# Patient Record
Sex: FEMALE | Race: WHITE | NOT HISPANIC OR LATINO | Employment: OTHER | URBAN - METROPOLITAN AREA
[De-identification: names, ages, dates, MRNs, and addresses within clinical notes are randomized per-mention and may not be internally consistent; named-entity substitution may affect disease eponyms.]

---

## 2018-03-21 PROBLEM — R07.89 OTHER CHEST PAIN: Status: ACTIVE | Noted: 2018-03-21

## 2018-03-21 NOTE — PROGRESS NOTES
Cardiology Consultation      Hali Garcia  1950  [id number]  [facility] [address]  [facility] [place] [address]    1  Chest pain, unspecified type  Echo stress test w contrast if indicated   2  Shortness of breath     3  Other chest pain          History:     Patient is a 44-year-old female who presents for consultation today regarding chest pain  Approximately 4 years ago she had a spontaneous frontal lobe hemorrhage from what she states was a ruptured cyst   She had neurosurgery and drainage  Since then, she has had a few seizures  Last few months she has become vegetarian  She cooks with her daughter and  she states that when she and her daughter are standing in her kitchen she experiences a tightness in the left side of her chest   She thinks this may be associated with taking her breath away  She walks with a group of friends yesterday and felt fine  She has no exertional symptoms  It seems to only occur when she is standing and cooking with her daughter in the kitchen  This has been ongoing for several weeks  This prompted a urgent center visit and she was ultimately discharged and told to follow-up with cardiologist     She has a history of hyper lipidemia and is on 10 mg of atorvastatin  Her last LDL was 104  She is not known to be hypertensive  Since her neurological event in 2014 things for her have been at times difficult  She has had anxiety, she had a seizure when she was on the elliptical and at times is afraid to exercise  She has had no syncopal episodes  She has a history of hypertension which has been controlled with losartan and amlodipine  No recent accelerated elevation of blood pressure per her recollection  Patient Active Problem List   Diagnosis    Other chest pain    Shortness of breath     No past medical history on file    Social History     Social History    Marital status:      Spouse name: N/A    Number of children: N/A    Years of education: N/A     Occupational History    Not on file  Social History Main Topics    Smoking status: Never Smoker    Smokeless tobacco: Never Used    Alcohol use Not on file    Drug use: Unknown    Sexual activity: Not on file     Other Topics Concern    Not on file     Social History Narrative    No narrative on file      No family history on file  Mother  80, dementia/CVA(); Father  80 laryngeal cancer,  MVA had stage 3 cancer, 1 brother healthy  No past surgical history on file   Brain surgery [facility]    Current Outpatient Prescriptions:     Acetaminophen 500 MG, Take 1 capsule by mouth, Disp: , Rfl:     amLODIPine (NORVASC) 2 5 mg tablet, TK 1 T PO D, Disp: , Rfl: 5    atorvastatin (LIPITOR) 10 mg tablet, TK 1 T PO NIGHTLY, Disp: , Rfl: 5    clonazePAM (KlonoPIN) 0 5 mg tablet, TK 1 T PO Q 12 H, Disp: , Rfl: 4    gabapentin (NEURONTIN) 100 mg capsule, , Disp: , Rfl: 3    lacosamide (VIMPAT) 200 mg tablet, Take 200 mg by mouth, Disp: , Rfl:     loratadine (CLARITIN) 10 mg tablet, Take 10 mg by mouth, Disp: , Rfl:     losartan (COZAAR) 50 mg tablet, TK 1 T PO QD, Disp: , Rfl: 5    Calcium Carb-Cholecalciferol (CALCIUM CARBONATE-VITAMIN D3 PO), Take by mouth, Disp: , Rfl:     clindamycin (CLEOCIN) 150 mg capsule, , Disp: , Rfl: 0  Allergies   Allergen Reactions    Carisoprodol Hives    Other     Penicillins Hives       Labs: No results found for: NA, K, CL, CO2, BUN, CREATININE, LABGLOM, GLUCOSE, CALCIUM    No results found for: WBC, HGB, HCT, PLT    No results found for: CHOL  No results found for: HDL  No results found for: LDLCALC  No results found for: TRIG    No results found for: ALT, AST          No results found for: NTBNP    No results found for: HGBA1C     Direct , TG 89, HDL 62, , creat 0 65, AST 15, ALT 11; TSH 1 16 2018    Imaging: Reviewed in epic      Review of Systems:  14 systems reviewed and negative with exception of the above     Review of Systems    PHYSICAL EXAM:    Physical Exam    Vitals:    03/23/18 1453   BP: 118/74   Pulse: 72     There is no height or weight on file to calculate BMI  Weight (last 2 days)     Date/Time   Weight    03/23/18 1453  66 5 (146 6)              Gen: No acute distress  HEENT: anicteric, mucous membranes moist  Neck: supple, no jugular venous distention, or carotid bruit  Heart: regular, normal s1 and s2, no murmur/rub or gallop  Lungs :clear to auscultation bilaterally, no rales/rhonchi or wheeze  Abdomen: soft nontender, normoactive bowel sounds, no organomegaly  Ext: warm and perfused, normal femoral pulses, no edema, or clubbing  Skin: warm, no rashes  Neuro: AAO x 3, no focal findings  Psychiatric: normal affect  Musculoskeletal: no obvious joint deformities  Discussion/Summary:    1  Chest pain, atypical with associated dyspnea (seems to occur with standing only; not exertional)    2  H/o hemorrhagic CVA/ left frontal hemorrhage, follows [facility]    3  Hyperlipidemia,  on atorva 10mg, no known vascular disease    Plan:  Patient has cardiac risk factors which include hypertension and elevated LDL  Symptoms, or atypical   Seems only getting when she stands in the kitchen cooking  She walked yesterday without difficulty  She states she is agreeable to exercise on the treadmill which she thinks she can do  With her dyspnea, we will plan on doing a stress echocardiogram   She prefers phone follow-up afterwards  I have encouraged her to continue on her current sensible diet and try to attempt daily exercise

## 2018-03-22 RX ORDER — GABAPENTIN 100 MG/1
CAPSULE ORAL
Refills: 3 | COMMUNITY
Start: 2018-02-09

## 2018-03-22 RX ORDER — CLINDAMYCIN HYDROCHLORIDE 150 MG/1
CAPSULE ORAL
Refills: 0 | COMMUNITY
Start: 2017-12-21

## 2018-03-22 RX ORDER — AMLODIPINE BESYLATE 2.5 MG/1
TABLET ORAL
Refills: 5 | COMMUNITY
Start: 2018-02-22

## 2018-03-22 RX ORDER — GABAPENTIN 300 MG/1
CAPSULE ORAL
Refills: 3 | COMMUNITY
Start: 2018-02-01 | End: 2018-03-23 | Stop reason: ALTCHOICE

## 2018-03-22 RX ORDER — LOSARTAN POTASSIUM 50 MG/1
TABLET ORAL
Refills: 5 | COMMUNITY
Start: 2018-02-22

## 2018-03-22 RX ORDER — LACOSAMIDE 200 MG/1
TABLET, FILM COATED ORAL
Refills: 2 | COMMUNITY
Start: 2018-02-22 | End: 2018-03-23 | Stop reason: SDUPTHER

## 2018-03-22 RX ORDER — ATORVASTATIN CALCIUM 10 MG/1
TABLET, FILM COATED ORAL
Refills: 5 | COMMUNITY
Start: 2018-02-22

## 2018-03-22 RX ORDER — CLONAZEPAM 0.5 MG/1
TABLET ORAL
Refills: 4 | COMMUNITY
Start: 2018-03-04

## 2018-03-23 ENCOUNTER — OFFICE VISIT (OUTPATIENT)
Dept: CARDIOLOGY CLINIC | Facility: CLINIC | Age: 68
End: 2018-03-23
Payer: MEDICARE

## 2018-03-23 VITALS — SYSTOLIC BLOOD PRESSURE: 118 MMHG | DIASTOLIC BLOOD PRESSURE: 74 MMHG | HEART RATE: 72 BPM | WEIGHT: 146.6 LBS

## 2018-03-23 DIAGNOSIS — R06.02 SHORTNESS OF BREATH: ICD-10-CM

## 2018-03-23 DIAGNOSIS — R07.9 CHEST PAIN, UNSPECIFIED TYPE: Primary | ICD-10-CM

## 2018-03-23 DIAGNOSIS — R07.89 OTHER CHEST PAIN: ICD-10-CM

## 2018-03-23 PROCEDURE — 99204 OFFICE O/P NEW MOD 45 MIN: CPT | Performed by: INTERNAL MEDICINE

## 2018-03-23 RX ORDER — LACOSAMIDE 200 MG/1
200 TABLET ORAL
COMMUNITY
Start: 2017-12-13

## 2018-03-23 RX ORDER — LORATADINE 10 MG/1
10 TABLET ORAL
COMMUNITY
Start: 2014-08-26

## 2018-04-23 ENCOUNTER — HOSPITAL ENCOUNTER (OUTPATIENT)
Dept: NON INVASIVE DIAGNOSTICS | Facility: CLINIC | Age: 68
Discharge: HOME | End: 2018-04-23
Payer: MEDICARE

## 2018-04-23 DIAGNOSIS — R07.9 CHEST PAIN, UNSPECIFIED TYPE: ICD-10-CM

## 2018-04-23 PROCEDURE — 93350 STRESS TTE ONLY: CPT

## 2018-04-23 PROCEDURE — 93351 STRESS TTE COMPLETE: CPT | Performed by: INTERNAL MEDICINE

## 2018-04-24 LAB
CHEST PAIN STATEMENT: NORMAL
MAX DIASTOLIC BP: 80 MMHG
MAX HEART RATE: 141 BPM
MAX PREDICTED HEART RATE: 155 BPM
MAX. SYSTOLIC BP: 150 MMHG
PROTOCOL NAME: NORMAL
REASON FOR TERMINATION: NORMAL
TARGET HR FORMULA: NORMAL
TEST INDICATION: NORMAL
TIME IN EXERCISE PHASE: NORMAL

## 2018-08-07 ENCOUNTER — EVALUATION (OUTPATIENT)
Dept: PHYSICAL THERAPY | Age: 68
End: 2018-08-07
Payer: MEDICARE

## 2018-08-07 DIAGNOSIS — G89.29 CHRONIC PAIN OF BOTH SHOULDERS: Primary | ICD-10-CM

## 2018-08-07 DIAGNOSIS — M25.512 CHRONIC PAIN OF BOTH SHOULDERS: Primary | ICD-10-CM

## 2018-08-07 DIAGNOSIS — M25.511 CHRONIC PAIN OF BOTH SHOULDERS: Primary | ICD-10-CM

## 2018-08-07 PROCEDURE — G8985 CARRY GOAL STATUS: HCPCS | Performed by: PHYSICAL THERAPIST

## 2018-08-07 PROCEDURE — G8984 CARRY CURRENT STATUS: HCPCS | Performed by: PHYSICAL THERAPIST

## 2018-08-07 PROCEDURE — 97162 PT EVAL MOD COMPLEX 30 MIN: CPT | Performed by: PHYSICAL THERAPIST

## 2018-08-07 NOTE — PROGRESS NOTES
PT Evaluation     Today's date: 2018  Patient name: Dg Kay  : 1950  MRN: [id number]  Referring provider: Belia Cortés MD  Dx:   Encounter Diagnosis     ICD-10-CM    1  Chronic pain of both shoulders M25 511     G89 29     M25 512                   Assessment  Impairments: abnormal or restricted ROM, abnormal movement, activity intolerance, impaired physical strength, pain with function and poor posture   Patient presents with symptom irritability yes  Assessment details: 74year old female patient reports to PT with B chronic shoulder pain  Patient primary impairments seems to be decreased mobility, as patient had both limited and painful AROM and PROM in all directions, with shoulder internal rotation being most affected, which correlates with finding of posterior capsule hypomobility  Patient's posture also affects patient's symptoms as when patient cued to self correct posture, her shoulder ROM increased  Patient will benefit from skilled PT services to address current impairments and functional limitations to help patient return to her PLOF  Understanding of Dx/Px/POC: good   Prognosis: fair  Prognosis details: chronicity    Goals  STG  1  Patient will be independent with completion of HEP throughout therapy  2  Patient will have at worst 3/10 pain so patient can sleep with less discomfort in 3 weeks  LTG  1  Patient will increase B shoulder flexion ROM by at least 75% so patient can complete overhead activities with less difficulty in 6 weeks  2  Patient will increase B shoulder IR ROM by at least 75% so patient can wash her back with less difficulty in 6 weeks       Plan  Patient would benefit from: skilled physical therapy  Planned therapy interventions: joint mobilization, manual therapy, neuromuscular re-education, patient education, strengthening, stretching, therapeutic activities, therapeutic exercise, home exercise program, functional ROM exercises and flexibility  Frequency: 2x week  Duration in weeks: 6  Treatment plan discussed with: patient        Subjective Evaluation    History of Present Illness  Mechanism of injury: Patient reports with B shoulder pain of "many years " Patient denies any numbness/tingling  Patient has trouble elevating B UEs overhead, lifting, washing her back, and sleeping due to her symptoms  Patient has history of 2 brain surgeries, seizures, which are controlled by medications, and a stroke which affects her R side  Pain  Current pain ratin  At best pain ratin  At worst pain ratin  Location: B shoulders  Quality: dull ache and sharp  Aggravating factors: overhead activity and lifting    Treatments  Current treatment: physical therapy  Patient Goals  Patient goals for therapy: decreased pain, increased motion and return to sport/leisure activities          Objective     Static Posture     Head  Forward  Shoulders  Rounded      Postural Observations  Seated posture: fair  Standing posture: fair  Correction of posture: makes symptoms better        Tenderness     Additional Tenderness Details  No TTP noted    Active Range of Motion   Cervical/Thoracic Spine   Normal active range of motion  Left Shoulder   Flexion: 120 degrees with pain  Abduction: 110 degrees with pain    Right Shoulder   Flexion: 135 degrees with pain  Abduction: 120 degrees with pain    Additional Active Range of Motion Details  With correction of posture patient had increase in ROM    Passive Range of Motion   Left Shoulder   Flexion: 150 degrees with pain  Abduction: 110 degrees with pain  External rotation 90°: 45 degrees with pain  Internal rotation 90°: 45 degrees with pain    Right Shoulder   Flexion: 160 degrees with pain  Abduction: 135 degrees with pain  External rotation 90°: 60 degrees with pain  Internal rotation 90°: 30 degrees with pain    Joint Play   Left Shoulder  Hypomobile in the posterior capsule, inferior capsule, thoracic spine and 1st rib  Right Shoulder  Hypomobile in the posterior capsule, inferior capsule, thoracic spine and 1st rib  Strength/Myotome Testing     Left Shoulder     Planes of Motion   Flexion: 4   Abduction: 4   External rotation at 0°: 3+   Internal rotation at 0°: 3+     Right Shoulder     Planes of Motion   Flexion: 4   Abduction: 4   External rotation at 0°: 3+   Internal rotation at 0°: 3+     Tests     Left Shoulder   Positive empty can, Hawkin's, painful arc and passive horizontal adduction  Negative full can  Right Shoulder   Positive empty can, Hawkin's, painful arc and passive horizontal adduction  Negative full can         Flowsheet Rows      Most Recent Value   PT/OT G-Codes   Current Score  58   Projected Score  63   FOTO information reviewed  Yes   Assessment Type  Evaluation   G code set  Carrying, Moving & Handling Objects   Carrying, Moving and Handling Objects Current Status ()  CK   Carrying, Moving and Handling Objects Goal Status ()  CJ          Precautions: history of seizures, 2 brain surgeries, stroke affecting R side     Daily Treatment Diary     Manual              PROM shoulder all motion B             Shoulder stretches all motions B             Posterior shoulder mobs B             Inferior shoulder mobs B             Standing shoulder 90 deg abd mob                 Exercise Diary              Shoulder wall slides flex/abd B             sidelying shoulder ER B             sidelying shoulder IR stretch B                          sidelying shoulder abd             Supine shoulder flex             Prone low trap retraction             Band straight arm pull downs             UBE                                                                                                                                                                Modalities

## 2018-08-07 NOTE — LETTER
2018    MD [address] [place] [address]    Patient: Danya Castro   YOB: 1950   Date of Visit: 2018     Encounter Diagnosis     ICD-10-CM    1  Chronic pain of both shoulders M25 511     G89 29     M25 512        Dear Dr Orquidea Lomax:    Please review the attached Plan of Care from [facility] recent visit  Please verify that you agree therapy should continue by signing the attached document and sending it back to our office  If you have any questions or concerns, please don't hesitate to call  Sincerely,    Yen Valencia, PT      Referring Provider:      I certify that I have read the below Plan of Care and certify the need for these services furnished under this plan of treatment while under my care  MD [facility] [address] [facility] 109: [phone]          PT Evaluation     Today's date: 2018  Patient name: Danya Castro  : 1950  MRN: [id number]  Referring provider: Tim Tripp MD  Dx:   Encounter Diagnosis     ICD-10-CM    1  Chronic pain of both shoulders M25 511     G89 29     M25 512                   Assessment  Impairments: abnormal or restricted ROM, abnormal movement, activity intolerance, impaired physical strength, pain with function and poor posture   Patient presents with symptom irritability yes  Assessment details: 74year old female patient reports to PT with B chronic shoulder pain  Patient primary impairments seems to be decreased mobility, as patient had both limited and painful AROM and PROM in all directions, with shoulder internal rotation being most affected, which correlates with finding of posterior capsule hypomobility  Patient's posture also affects patient's symptoms as when patient cued to self correct posture, her shoulder ROM increased    Patient will benefit from skilled PT services to address current impairments and functional limitations to help patient return to her PLOF  Understanding of Dx/Px/POC: good   Prognosis: fair  Prognosis details: chronicity    Goals  STG  1  Patient will be independent with completion of HEP throughout therapy  2  Patient will have at worst 3/10 pain so patient can sleep with less discomfort in 3 weeks  LTG  1  Patient will increase B shoulder flexion ROM by at least 75% so patient can complete overhead activities with less difficulty in 6 weeks  2  Patient will increase B shoulder IR ROM by at least 75% so patient can wash her back with less difficulty in 6 weeks  Plan  Patient would benefit from: skilled physical therapy  Planned therapy interventions: joint mobilization, manual therapy, neuromuscular re-education, patient education, strengthening, stretching, therapeutic activities, therapeutic exercise, home exercise program, functional ROM exercises and flexibility  Frequency: 2x week  Duration in weeks: 6  Treatment plan discussed with: patient        Subjective Evaluation    History of Present Illness  Mechanism of injury: Patient reports with B shoulder pain of "many years " Patient denies any numbness/tingling  Patient has trouble elevating B UEs overhead, lifting, washing her back, and sleeping due to her symptoms  Patient has history of 2 brain surgeries, seizures, which are controlled by medications, and a stroke which affects her R side  Pain  Current pain ratin  At best pain ratin  At worst pain ratin  Location: B shoulders  Quality: dull ache and sharp  Aggravating factors: overhead activity and lifting    Treatments  Current treatment: physical therapy  Patient Goals  Patient goals for therapy: decreased pain, increased motion and return to sport/leisure activities          Objective     Static Posture     Head  Forward  Shoulders  Rounded      Postural Observations  Seated posture: fair  Standing posture: fair  Correction of posture: makes symptoms better        Tenderness     Additional Tenderness Details  No TTP noted    Active Range of Motion   Cervical/Thoracic Spine   Normal active range of motion  Left Shoulder   Flexion: 120 degrees with pain  Abduction: 110 degrees with pain    Right Shoulder   Flexion: 135 degrees with pain  Abduction: 120 degrees with pain    Additional Active Range of Motion Details  With correction of posture patient had increase in ROM    Passive Range of Motion   Left Shoulder   Flexion: 150 degrees with pain  Abduction: 110 degrees with pain  External rotation 90°:  45 degrees with pain  Internal rotation 90°:  45 degrees with pain    Right Shoulder   Flexion: 160 degrees with pain  Abduction: 135 degrees with pain  External rotation 90°:  60 degrees with pain  Internal rotation 90°:  30 degrees with pain    Joint Play   Left Shoulder  Hypomobile in the posterior capsule, inferior capsule, thoracic spine and 1st rib  Right Shoulder  Hypomobile in the posterior capsule, inferior capsule, thoracic spine and 1st rib  Strength/Myotome Testing     Left Shoulder     Planes of Motion   Flexion: 4   Abduction: 4   External rotation at 0°:  3+   Internal rotation at 0°:  3+     Right Shoulder     Planes of Motion   Flexion: 4   Abduction: 4   External rotation at 0°:  3+   Internal rotation at 0°:  3+     Tests     Left Shoulder   Positive empty can, Hawkin's, painful arc and passive horizontal adduction  Negative full can  Right Shoulder   Positive empty can, Hawkin's, painful arc and passive horizontal adduction  Negative full can         Flowsheet Rows      Most Recent Value   PT/OT G-Codes   Current Score  58   Projected Score  63   FOTO information reviewed  Yes   Assessment Type  Evaluation   G code set  Carrying, Moving & Handling Objects   Carrying, Moving and Handling Objects Current Status ()  CK   Carrying, Moving and Handling Objects Goal Status ()  CJ          Precautions: history of seizures, 2 brain surgeries, stroke affecting R side     Daily Treatment Diary     Manual              PROM shoulder all motion B             Shoulder stretches all motions B             Posterior shoulder mobs B             Inferior shoulder mobs B             Standing shoulder 90 deg abd mob                 Exercise Diary              Shoulder wall slides flex/abd B             sidelying shoulder ER B             sidelying shoulder IR stretch B                          sidelying shoulder abd             Supine shoulder flex             Prone low trap retraction             Band straight arm pull downs             UBE                                                                                                                                                                Modalities

## 2018-08-09 ENCOUNTER — OFFICE VISIT (OUTPATIENT)
Dept: PHYSICAL THERAPY | Age: 68
End: 2018-08-09
Payer: MEDICARE

## 2018-08-09 DIAGNOSIS — G89.29 CHRONIC PAIN OF BOTH SHOULDERS: Primary | ICD-10-CM

## 2018-08-09 DIAGNOSIS — M25.511 CHRONIC PAIN OF BOTH SHOULDERS: Primary | ICD-10-CM

## 2018-08-09 DIAGNOSIS — M25.512 CHRONIC PAIN OF BOTH SHOULDERS: Primary | ICD-10-CM

## 2018-08-09 PROCEDURE — 97140 MANUAL THERAPY 1/> REGIONS: CPT

## 2018-08-09 PROCEDURE — 97110 THERAPEUTIC EXERCISES: CPT

## 2018-08-09 NOTE — PROGRESS NOTES
Daily Note     Today's date: 2018  Patient name: Ellis Watson  : 1950  MRN: [id number]  Referring provider: Bijan Michel MD  Dx:   Encounter Diagnosis     ICD-10-CM    1  Chronic pain of both shoulders M25 511     G89 29     M25 512        Start Time: 953          Subjective: Patient reported 1/10 pain in shoulders L shoulder worse than R        Objective: See treatment diary below      Assessment: Tolerated treatment well  Patient demonstrated fatigue post treatment, exhibited good technique with therapeutic exercises and would benefit from continued PT  Repeated cues required throughout the session on proper technique  HEP reviewed with patient  Pt L shoulder making  Popping noises 3 times but no pain  Plan: Continue per plan of care        Precautions: history of seizures, 2 brain surgeries, stroke affecting R side     Daily Treatment Diary     Manual              PROM shoulder all motion B 5 min            Shoulder stretches all motions B 30sec x3            Posterior shoulder mobs B NT            Inferior shoulder mobs B NT            Standing shoulder 90 deg abd mob NT                Exercise Diary              Shoulder wall slides flex/abd B 20x            sidelying shoulder ER B 2x10            sidelying shoulder IR stretch B 30" x3                         sidelying shoulder abd B 2x10            Supine shoulder flex B 2x10            Prone low trap retraction NT            Band straight arm pull downs NT            UBE 8 min 4/4                                                                                                                                                               Modalities

## 2018-08-14 ENCOUNTER — OFFICE VISIT (OUTPATIENT)
Dept: PHYSICAL THERAPY | Age: 68
End: 2018-08-14
Payer: MEDICARE

## 2018-08-14 DIAGNOSIS — M25.512 CHRONIC PAIN OF BOTH SHOULDERS: Primary | ICD-10-CM

## 2018-08-14 DIAGNOSIS — G89.29 CHRONIC PAIN OF BOTH SHOULDERS: Primary | ICD-10-CM

## 2018-08-14 DIAGNOSIS — M25.511 CHRONIC PAIN OF BOTH SHOULDERS: Primary | ICD-10-CM

## 2018-08-14 PROCEDURE — 97140 MANUAL THERAPY 1/> REGIONS: CPT

## 2018-08-14 PROCEDURE — 97110 THERAPEUTIC EXERCISES: CPT

## 2018-08-14 NOTE — PROGRESS NOTES
Daily Note     Today's date: 2018  Patient name: Chema Harper  : 1950  MRN: [id number]  Referring provider: Zeyad Hills MD  Dx:   Encounter Diagnosis     ICD-10-CM    1  Chronic pain of both shoulders M25 511     G89 29     M25 512                   Subjective: Pt feels more limited in the L UE due to pain medial aspect of the shoulder  Limited with exercises and ROM  Wall slide difficult and caused pain  Objective: See treatment diary below      Assessment: Tolerated treatment well  Patient demonstrated fatigue post treatment, exhibited good technique with therapeutic exercises and would benefit from continued PT  Decrease ER on L UE noted increase ROM noted post manuals  Pt would benefit to continue stretching LUE IR along with ABD due to limited ROM  Plan: Continue per plan of care        Precautions: history of seizures, 2 brain surgeries, stroke affecting R side     Daily Treatment Diary     Manual             PROM shoulder all motion B 5 min 20x BUE           Shoulder stretches all motions B 30sec x3 30x3           Posterior shoulder mobs B NT NT           Inferior shoulder mobs B NT NT           Standing shoulder 90 deg abd mob NT NT               Exercise Diary             Shoulder wall slides flex/abd B 20x HEP           sidelying shoulder ER B 2x10 2x10           sidelying shoulder IR stretch B 30" x3 30" x3                        sidelying shoulder abd B 2x10 2x10           Supine shoulder flex B 2x10 2x10           Prone low trap retraction NT NT           Band straight arm pull downs NT Red 2x10           UBE 8 min 4/4 5 min                                                                                                                                                              Modalities

## 2018-08-17 ENCOUNTER — APPOINTMENT (OUTPATIENT)
Dept: PHYSICAL THERAPY | Age: 68
End: 2018-08-17
Payer: MEDICARE

## 2018-08-21 ENCOUNTER — OFFICE VISIT (OUTPATIENT)
Dept: PHYSICAL THERAPY | Age: 68
End: 2018-08-21
Payer: MEDICARE

## 2018-08-21 DIAGNOSIS — M25.511 CHRONIC PAIN OF BOTH SHOULDERS: Primary | ICD-10-CM

## 2018-08-21 DIAGNOSIS — M25.512 CHRONIC PAIN OF BOTH SHOULDERS: Primary | ICD-10-CM

## 2018-08-21 DIAGNOSIS — G89.29 CHRONIC PAIN OF BOTH SHOULDERS: Primary | ICD-10-CM

## 2018-08-21 PROCEDURE — 97110 THERAPEUTIC EXERCISES: CPT

## 2018-08-21 PROCEDURE — 97140 MANUAL THERAPY 1/> REGIONS: CPT

## 2018-08-21 NOTE — PROGRESS NOTES
Daily Note     Today's date: 2018  Patient name: Mikayla Perkins  : 1950  MRN: [id number]  Referring provider: Tasia Fountain MD  Dx:   Encounter Diagnosis     ICD-10-CM    1  Chronic pain of both shoulders M25 511     G89 29     M25 512                   Subjective: Pt denies pain but mentioned that today       Objective: See treatment diary below      Assessment: Tolerated treatment well  Patient demonstrated fatigue post treatment, exhibited good technique with therapeutic exercises and would benefit from continued PT  Patient upset during the treatment due to clinic song choice  [place] was changed and patient continued with therapy session  Increase difficulty with side lying exercises by adding wt next visit  Plan: Continue per plan of care        Precautions: history of seizures, 2 brain surgeries, stroke affecting R side     Daily Treatment Diary     Manual            PROM shoulder all motion B 5 min 20x BUE 20x BUE          Shoulder stretches all motions B 30sec x3 30x3           Posterior shoulder mobs B NT NT           Inferior shoulder mobs B NT NT           Standing shoulder 90 deg abd mob NT NT               Exercise Diary            Shoulder wall slides flex/abd B 20x HEP           sidelying shoulder ER B 2x10 2x10 2x10          sidelying shoulder IR stretch B 30" x3 30" x3 30"x3                       sidelying shoulder abd B 2x10 2x10 2x10          Supine shoulder flex B 2x10 2x10 2x10          Prone low trap retraction NT NT Nt          Band straight arm pull downs NT Red 2x10 Green 2x10          UBE 8 min 4/4 5 min 8 min 4/4                                                                                                                                                             Modalities

## 2018-08-23 ENCOUNTER — OFFICE VISIT (OUTPATIENT)
Dept: PHYSICAL THERAPY | Age: 68
End: 2018-08-23
Payer: MEDICARE

## 2018-08-23 DIAGNOSIS — G89.29 CHRONIC PAIN OF BOTH SHOULDERS: Primary | ICD-10-CM

## 2018-08-23 DIAGNOSIS — M25.512 CHRONIC PAIN OF BOTH SHOULDERS: Primary | ICD-10-CM

## 2018-08-23 DIAGNOSIS — M25.511 CHRONIC PAIN OF BOTH SHOULDERS: Primary | ICD-10-CM

## 2018-08-23 PROCEDURE — 97110 THERAPEUTIC EXERCISES: CPT

## 2018-08-23 PROCEDURE — 97140 MANUAL THERAPY 1/> REGIONS: CPT

## 2018-08-23 NOTE — PROGRESS NOTES
Daily Note     Today's date: 2018  Patient name: Zoraida Matt  : 1950  MRN: [id number]  Referring provider: Dahiana Medrano MD  Dx:   Encounter Diagnosis     ICD-10-CM    1  Chronic pain of both shoulders M25 511     G89 29     M25 512                   Subjective: Patient mentioned that therapy seems to be helping still some pain in L anterior shoulder  Pt also reported at times her L shoulder feels like it comes out of place (happening for years) and needs to be placed back in to get more range  Pt able to lift cups and plates and place them back in the cabinets at home without any pain in R or L Shoulder  Objective: See treatment diary below      Assessment: Tolerated treatment well  Pt muscle guarding on L with all motions especially ER  Increase ROM and less pain with L shoulder wall slides  Increase to 2 sec  Hold with ABD L shoulder wall slides to increase ROM  Plan: Continue per plan of care        Precautions: history of seizures, 2 brain surgeries, stroke affecting R side     Daily Treatment Diary     Manual           PROM shoulder all motion B 5 min 20x BUE 20x BUE 20x BUE         Shoulder stretches all motions B 30sec x3 30x3           Posterior shoulder mobs B NT NT           Inferior shoulder mobs B NT NT           Standing shoulder 90 deg abd mob NT NT               Exercise Diary           Shoulder wall slides flex/abd B 20x HEP  2x15         sidelying shoulder ER B 2x10 2x10 2x10 2x15 1#         sidelying shoulder IR stretch B 30" x3 30" x3 30"x3 30" x3                      sidelying shoulder abd B 2x10 2x10 2x10 2x15 1#         Supine shoulder flex B 2x10 2x10 2x10 2x15 1#         Prone low trap retraction NT NT Nt NT         Band straight arm pull downs NT Red 2x10 Green 2x10 Green 2x10         UBE 8 min 4/4 5 min 8 min 4/4 8 min 4x4 Modalities

## 2018-08-27 ENCOUNTER — OFFICE VISIT (OUTPATIENT)
Dept: PHYSICAL THERAPY | Age: 68
End: 2018-08-27
Payer: MEDICARE

## 2018-08-27 DIAGNOSIS — M25.512 CHRONIC PAIN OF BOTH SHOULDERS: Primary | ICD-10-CM

## 2018-08-27 DIAGNOSIS — G89.29 CHRONIC PAIN OF BOTH SHOULDERS: Primary | ICD-10-CM

## 2018-08-27 DIAGNOSIS — M25.511 CHRONIC PAIN OF BOTH SHOULDERS: Primary | ICD-10-CM

## 2018-08-27 PROCEDURE — 97140 MANUAL THERAPY 1/> REGIONS: CPT

## 2018-08-27 PROCEDURE — 97110 THERAPEUTIC EXERCISES: CPT

## 2018-08-27 NOTE — PROGRESS NOTES
Daily Note     Today's date: 2018  Patient name: Loralee Sandhoff  : 1950  MRN: [id number]  Referring provider: David Olivarez MD  Dx:   Encounter Diagnosis     ICD-10-CM    1  Chronic pain of both shoulders M25 511     G89 29     M25 512        Start Time: 1030  Stop Time: 1115  Total time in clinic (min): 45 minutes    Subjective: Patient concerned about not isolating the right muscles while performing B shoulder exercises at home  Pt mentioned feeling like she is getting better  Objective: See treatment diary below      Assessment: Tolerated treatment well  L shoulder side lying abd performed without wt due to patient request of protecting L side of brain  All exercises completed with cues for what to isolate while performing  Decreased TB to red due to increase difficulty with isolation  Kamari PT observed and checked out L shoulder popping  Plan: Continue per plan of care  Last visit print out new updated HEP program for patient to follow post D/C       Precautions: history of seizures, 2 brain surgeries, stroke affecting R side     Daily Treatment Diary     Manual          PROM shoulder all motion B 5 min 20x BUE 20x BUE 20x BUE 20x        Shoulder stretches all motions B 30sec x3 30x3           Posterior shoulder mobs B NT NT           Inferior shoulder mobs B NT NT           Standing shoulder 90 deg abd mob NT NT               Exercise Diary          Shoulder wall slides flex/abd B 20x HEP  2x15         sidelying shoulder ER B 2x10 2x10 2x10 2x15 1# 2x15 1# BUE        sidelying shoulder IR stretch B 30" x3 30" x3 30"x3 30" x3 30"x3 BUE                     sidelying shoulder abd B 2x10 2x10 2x10 2x15 1# 2x15 1# R  3x15 no wt L        Supine shoulder flex B 2x10 2x10 2x10 2x15 1# 2x15 1#        Prone low trap retraction NT NT Nt NT NT        Band straight arm pull downs NT Red 2x10 Green 2x10 Green 2x10 Red 2x10        UBE 8 min 4/4 5 min 8 min 4/4 8 min 4x4 8 min 4/4                                                                                                                                                           Modalities

## 2018-08-31 ENCOUNTER — OFFICE VISIT (OUTPATIENT)
Dept: PHYSICAL THERAPY | Age: 68
End: 2018-08-31
Payer: MEDICARE

## 2018-08-31 DIAGNOSIS — M25.512 CHRONIC PAIN OF BOTH SHOULDERS: Primary | ICD-10-CM

## 2018-08-31 DIAGNOSIS — M25.511 CHRONIC PAIN OF BOTH SHOULDERS: Primary | ICD-10-CM

## 2018-08-31 DIAGNOSIS — G89.29 CHRONIC PAIN OF BOTH SHOULDERS: Primary | ICD-10-CM

## 2018-08-31 PROCEDURE — G8985 CARRY GOAL STATUS: HCPCS | Performed by: PHYSICAL THERAPIST

## 2018-08-31 PROCEDURE — G8986 CARRY D/C STATUS: HCPCS | Performed by: PHYSICAL THERAPIST

## 2018-08-31 PROCEDURE — 97112 NEUROMUSCULAR REEDUCATION: CPT | Performed by: PHYSICAL THERAPIST

## 2018-08-31 PROCEDURE — 97110 THERAPEUTIC EXERCISES: CPT | Performed by: PHYSICAL THERAPIST

## 2018-08-31 NOTE — PROGRESS NOTES
PT Discharge Note     Today's date: 2018  Patient name: Kylee Chacon  : 1950  MRN: [id number]  Referring provider: Peter Cooley MD  Dx:   Encounter Diagnosis     ICD-10-CM    1  Chronic pain of both shoulders M25 511     G89 29     M25 512                   Subjective: Patient notes feeling a little sore today, but still improving functionally, and is going on a road trip for "a long time" to see her nephew so requests today be her last day        Objective: See treatment diary below    Manual             PROM shoulder all motion B 5 min 20x BUE 20x BUE 20x BUE 20x             Shoulder stretches all motions B 30sec x3 30x3                   Posterior shoulder mobs B NT NT                   Inferior shoulder mobs B NT NT                   Standing shoulder 90 deg abd mob NT NT                         Exercise Diary             Shoulder wall slides flex/abd B 20x HEP   2x15   15x each way B            sidelying shoulder ER B 2x10 2x10 2x10 2x15 1# 2x15 1# BUE  2x15 1 lb B           sidelying shoulder IR stretch B 30" x3 30" x3 30"x3 30" x3 30"x3 BUE  3x30" B                                    sidelying shoulder abd B 2x10 2x10 2x10 2x15 1# 2x15 1# R  3x15 no wt L  2x15 1 lb B            Supine shoulder flex B 2x10 2x10 2x10 2x15 1# 2x15 1#  2x15 1 lb B           Prone low trap retraction NT NT Nt NT NT             Band straight arm pull downs NT Red 2x10 Green 2x10 Green 2x10 Red 2x10             UBE 8 min 4/4 5 min 8 min 4/4 8 min 4x4 8 min 4/4  8 min 4/4                                                                                                                                                                                                                                                                                         Modalities                                                                                              Active Range of Motion   Left Shoulder   Flexion: 160 degrees   Abduction: 150 degrees    Right Shoulder   Flexion: 160 degrees   Abduction: 155 degrees        Strength/Myotome Testing     Left Shoulder      Planes of Motion   Flexion: 4   Abduction: 4   External rotation at 0°: 4-  Internal rotation at 0°: 4-     Right Shoulder      Planes of Motion   Flexion: 4   Abduction: 4   External rotation at 0°: 4-  Internal rotation at 0°: 4-     Pain  Current pain ratin  At best pain ratin  At worst pain rating: 3  Location: B shoulders    Assessment: Tolerated treatment well  Patient is being discharged due to almost meeting all of her functional goals but showing ability to self manage symptoms outside of PT  Patient educated to continue to complete HEP to help to continue to progress her functionality and decrease her symptoms  Goals  STG  1  Patient will be independent with completion of HEP throughout therapy - MET  2  Patient will have at worst 3/10 pain so patient can sleep with less discomfort in 3 weeks  - MET  LTG  1  Patient will increase B shoulder flexion ROM by at least 75% so patient can complete overhead activities with less difficulty in 6 weeks  - MET  2   Patient will increase B shoulder IR ROM by at least 75% so patient can wash her back with less difficulty in 6 weeks  - NOT MET    Plan: Discharge

## 2019-01-18 ENCOUNTER — EVALUATION (OUTPATIENT)
Dept: PHYSICAL THERAPY | Age: 69
End: 2019-01-18
Payer: MEDICARE

## 2019-01-18 ENCOUNTER — TRANSCRIBE ORDERS (OUTPATIENT)
Dept: PHYSICAL THERAPY | Age: 69
End: 2019-01-18

## 2019-01-18 DIAGNOSIS — M25.512 ACUTE PAIN OF LEFT SHOULDER: Primary | ICD-10-CM

## 2019-01-18 DIAGNOSIS — M25.512 CHRONIC LEFT SHOULDER PAIN: Primary | ICD-10-CM

## 2019-01-18 DIAGNOSIS — G89.29 CHRONIC LEFT SHOULDER PAIN: Primary | ICD-10-CM

## 2019-01-18 PROCEDURE — G8984 CARRY CURRENT STATUS: HCPCS | Performed by: PHYSICAL THERAPIST

## 2019-01-18 PROCEDURE — 97140 MANUAL THERAPY 1/> REGIONS: CPT | Performed by: PHYSICAL THERAPIST

## 2019-01-18 PROCEDURE — 97162 PT EVAL MOD COMPLEX 30 MIN: CPT | Performed by: PHYSICAL THERAPIST

## 2019-01-18 PROCEDURE — G8985 CARRY GOAL STATUS: HCPCS | Performed by: PHYSICAL THERAPIST

## 2019-01-18 NOTE — LETTER
2019    Le Delvalle MD  [address] [address]    Patient: Lo Alvarenga   YOB: 1950   Date of Visit: 2019     Encounter Diagnosis     ICD-10-CM    1  Chronic left shoulder pain M25 512     G89 29        Dear Dr Wilfredo Collins:    Please review the attached Plan of Care from [facility] recent visit  Please verify that you agree therapy should continue by signing the attached document and sending it back to our office  If you have any questions or concerns, please don't hesitate to call  Sincerely,    Kindra Jonas, PT      Referring Provider:      I certify that I have read the below Plan of Care and certify the need for these services furnished under this plan of treatment while under my care  Le Delvalle MD  [facility] 31: [phone]          PT Evaluation     Today's date: 2019  Patient name: Lo Alvarenga  : 1950  MRN: [id number]  Referring provider: Cora Wetzel MD  Dx:   Encounter Diagnosis     ICD-10-CM    1  Chronic left shoulder pain M25 512     G89 29                   Assessment  Assessment details: 79year old female patient reports to PT with chronic L shoulder pain  Patient's primary movement impairments are weakness and decreased mobility, especially into shoulder IR and ER  Patient's L first rib is also hypomobile, as post mobilization patient had increase in shoulder IR and it was less painful  Patient's poor posture can also be contributing to her symptoms  Patient will benefit from skilled PT services to address current impairments and functional limitations to help patient return to her PLOF     Impairments: abnormal or restricted ROM, abnormal movement, activity intolerance, impaired physical strength and pain with function    Symptom irritability: lowUnderstanding of Dx/Px/POC: good   Prognosis: fair  Prognosis details: Chronicity, PMH    Goals  STG  1  Patient will be independent with completion of HEP throughout therapy  2  Patient will increase L shoulder IR ROM by at least 50% so patient can tuck in the back of her shirt in 3 weeks  LTG  1  Patient will increase shoulder elevation ROM by at least 50% so patient can complete overhead activity with less difficulty in 6 weeks  2  Patient will increase proximal shoulder strength by at least 1/2 grade so patient can lift with less difficulty in 6 weeks  Plan  Patient would benefit from: skilled physical therapy  Planned therapy interventions: joint mobilization, manual therapy, neuromuscular re-education, patient education, strengthening, stretching, therapeutic activities, therapeutic exercise, home exercise program, functional ROM exercises and flexibility  Frequency: 2x week  Duration in weeks: 6  Treatment plan discussed with: patient        Subjective Evaluation    History of Present Illness  Mechanism of injury: Patient reports with chronic L shoulder pain  Patient denies numbness/tingling  Patient has difficulty tucking in the back of her shirt, with overhead activity, and with lifting due to her L shoulder symptoms    Pain  Current pain ratin  At best pain ratin  At worst pain ratin  Location: L shoulder  Aggravating factors: overhead activity and lifting    Treatments  Previous treatment: physical therapy  Current treatment: physical therapy  Patient Goals  Patient goals for therapy: decreased pain, increased motion, increased strength and return to sport/leisure activities          Objective     Active Range of Motion   Left Shoulder   Flexion: 135 degrees with pain  Abduction: 135 degrees with pain    Right Shoulder   Flexion: 170 degrees   Abduction: 170 degrees     Passive Range of Motion   Left Shoulder   External rotation 90°:  30 degrees with pain  Internal rotation 90°:  20 degrees with pain    Right Shoulder   External rotation 90°:  35 degrees with pain  Internal rotation 90°:  25 degrees with pain    Joint Play   Left Shoulder  Hypomobile in the anterior capsule, posterior capsule, inferior capsule and 1st rib  Right Shoulder  Hypomobile in the anterior capsule, posterior capsule and inferior capsule  Strength/Myotome Testing     Left Shoulder     Planes of Motion   External rotation at 0°:  3+   Internal rotation at 0°:  3+     Isolated Muscles   Lower trapezius: 3-   Middle trapezius: 3-   Serratus anterior: 3-     Right Shoulder     Planes of Motion   External rotation at 0°:  4-   Internal rotation at 0°:  4-     Isolated Muscles   Lower trapezius: 3-   Middle trapezius: 3-   Serratus anterior: 3-       Flowsheet Rows      Most Recent Value   PT/OT G-Codes   Current Score  54   Projected Score  62   FOTO information reviewed  Yes   Assessment Type  Evaluation   G code set  Carrying, Moving & Handling Objects   Carrying, Moving and Handling Objects Current Status ()  CK   Carrying, Moving and Handling Objects Goal Status ()  CJ          Precautions: history of seizures, 2 brain surgeries, stroke affecting R side history of seizures    Daily Treatment Diary     Manual  1/18            L first rib mob 5 min Gr III/IV            shoulder posterior mob L 5 min Gr III/IV ? ?             PROM in all directions L  If needed                                          Exercise Diary              Thoracic extension over horizontal bolster             sidelying shoulder ER             Prone low trap retraction                          UBE             Wall slides flexion or scaption             Supine serratus punches                                                                                                                                                                                           Modalities

## 2019-01-18 NOTE — PROGRESS NOTES
PT Evaluation     Today's date: 2019  Patient name: Florin Washington  : 1950  MRN: [id number]  Referring provider: Hilda Loo MD  Dx:   Encounter Diagnosis     ICD-10-CM    1  Chronic left shoulder pain M25 512     G89 29                   Assessment  Assessment details: 79year old female patient reports to PT with chronic L shoulder pain  Patient's primary movement impairments are weakness and decreased mobility, especially into shoulder IR and ER  Patient's L first rib is also hypomobile, as post mobilization patient had increase in shoulder IR and it was less painful  Patient's poor posture can also be contributing to her symptoms  Patient will benefit from skilled PT services to address current impairments and functional limitations to help patient return to her PLOF  Impairments: abnormal or restricted ROM, abnormal movement, activity intolerance, impaired physical strength and pain with function    Symptom irritability: lowUnderstanding of Dx/Px/POC: good   Prognosis: fair  Prognosis details: Chronicity, PMH    Goals  STG  1  Patient will be independent with completion of HEP throughout therapy  2  Patient will increase L shoulder IR ROM by at least 50% so patient can tuck in the back of her shirt in 3 weeks  LTG  1  Patient will increase shoulder elevation ROM by at least 50% so patient can complete overhead activity with less difficulty in 6 weeks  2  Patient will increase proximal shoulder strength by at least 1/2 grade so patient can lift with less difficulty in 6 weeks      Plan  Patient would benefit from: skilled physical therapy  Planned therapy interventions: joint mobilization, manual therapy, neuromuscular re-education, patient education, strengthening, stretching, therapeutic activities, therapeutic exercise, home exercise program, functional ROM exercises and flexibility  Frequency: 2x week  Duration in weeks: 6  Treatment plan discussed with: patient        Subjective Evaluation    History of Present Illness  Mechanism of injury: Patient reports with chronic L shoulder pain  Patient denies numbness/tingling  Patient has difficulty tucking in the back of her shirt, with overhead activity, and with lifting due to her L shoulder symptoms  Pain  Current pain ratin  At best pain ratin  At worst pain ratin  Location: L shoulder  Aggravating factors: overhead activity and lifting    Treatments  Previous treatment: physical therapy  Current treatment: physical therapy  Patient Goals  Patient goals for therapy: decreased pain, increased motion, increased strength and return to sport/leisure activities          Objective     Active Range of Motion   Left Shoulder   Flexion: 135 degrees with pain  Abduction: 135 degrees with pain    Right Shoulder   Flexion: 170 degrees   Abduction: 170 degrees     Passive Range of Motion   Left Shoulder   External rotation 90°: 30 degrees with pain  Internal rotation 90°: 20 degrees with pain    Right Shoulder   External rotation 90°: 35 degrees with pain  Internal rotation 90°: 25 degrees with pain    Joint Play   Left Shoulder  Hypomobile in the anterior capsule, posterior capsule, inferior capsule and 1st rib  Right Shoulder  Hypomobile in the anterior capsule, posterior capsule and inferior capsule       Strength/Myotome Testing     Left Shoulder     Planes of Motion   External rotation at 0°: 3+   Internal rotation at 0°: 3+     Isolated Muscles   Lower trapezius: 3-   Middle trapezius: 3-   Serratus anterior: 3-     Right Shoulder     Planes of Motion   External rotation at 0°: 4-   Internal rotation at 0°: 4-     Isolated Muscles   Lower trapezius: 3-   Middle trapezius: 3-   Serratus anterior: 3-       Flowsheet Rows      Most Recent Value   PT/OT G-Codes   Current Score  54   Projected Score  62   FOTO information reviewed  Yes   Assessment Type  Evaluation   G code set  Carrying, Moving & Handling Objects   Carrying, Moving and Handling Objects Current Status ()  CK   Carrying, Moving and Handling Objects Goal Status ()  CJ          Precautions: history of seizures, 2 brain surgeries, stroke affecting R side history of seizures    Daily Treatment Diary     Manual  1/18            L first rib mob 5 min Gr III/IV            shoulder posterior mob L 5 min Gr III/IV ? ?             PROM in all directions L  If needed                                          Exercise Diary              Thoracic extension over horizontal bolster             sidelying shoulder ER             Prone low trap retraction                          UBE             Wall slides flexion or scaption             Supine serratus punches                                                                                                                                                                                           Modalities

## 2019-01-22 ENCOUNTER — OFFICE VISIT (OUTPATIENT)
Dept: PHYSICAL THERAPY | Age: 69
End: 2019-01-22
Payer: MEDICARE

## 2019-01-22 DIAGNOSIS — G89.29 CHRONIC PAIN OF BOTH SHOULDERS: ICD-10-CM

## 2019-01-22 DIAGNOSIS — M25.511 CHRONIC PAIN OF BOTH SHOULDERS: ICD-10-CM

## 2019-01-22 DIAGNOSIS — M25.512 CHRONIC LEFT SHOULDER PAIN: Primary | ICD-10-CM

## 2019-01-22 DIAGNOSIS — M25.512 CHRONIC PAIN OF BOTH SHOULDERS: ICD-10-CM

## 2019-01-22 DIAGNOSIS — G89.29 CHRONIC LEFT SHOULDER PAIN: Primary | ICD-10-CM

## 2019-01-22 PROCEDURE — 97110 THERAPEUTIC EXERCISES: CPT | Performed by: PHYSICAL THERAPIST

## 2019-01-22 PROCEDURE — 97140 MANUAL THERAPY 1/> REGIONS: CPT | Performed by: PHYSICAL THERAPIST

## 2019-01-22 NOTE — PROGRESS NOTES
Daily Note     Today's date: 2019  Patient name: Frankey Layer  : 1950  MRN: [id number]  Referring provider: Mendoza Quinones MD  Dx:   Encounter Diagnosis     ICD-10-CM    1  Chronic left shoulder pain M25 512     G89 29    2  Chronic pain of both shoulders M25 511     G89 29     M25 512                   Subjective: Patient notes hasn't been completing HEP consistently since IE  Objective: See treatment diary below    Precautions: history of seizures, 2 brain surgeries, stroke affecting R side history of seizures     Daily Treatment Diary      Manual                     L first rib mob 5 min Gr III/IV  5 min Gr III/IV                   shoulder posterior mob L 5 min Gr III/IV ? ?  5 min Gr III/IV D/C                   PROM in all directions L  If needed                      L shoulder IR MWM    2x10                                                 Exercise Diary                        Thoracic extension over horizontal bolster    2 min                    sidelying shoulder ER    3x10 L                    Prone low trap retraction    2x10                                            UBE    6 min                   Wall slides flexion or scaption    2x10 flexion/scaption                   Supine serratus punches     2x10 L                                                                                                                                                                                                                                                                                                                                                 Modalities                                                                                                      Assessment: Tolerated treatment well  Patient had no increase in symptoms post treatment  Patient responds better to L first rib mobs as compared to posterior shoulder mob    Consider adding just general passive stretching for shoulder IR next visit  Patient's primary impairment with L UE elevation is still weakness, as passively and active assistively patient can elevate UE above her head  Plan: Progress treatment as tolerated

## 2019-01-24 ENCOUNTER — OFFICE VISIT (OUTPATIENT)
Dept: PHYSICAL THERAPY | Age: 69
End: 2019-01-24
Payer: MEDICARE

## 2019-01-24 DIAGNOSIS — G89.29 CHRONIC LEFT SHOULDER PAIN: Primary | ICD-10-CM

## 2019-01-24 DIAGNOSIS — M25.512 CHRONIC LEFT SHOULDER PAIN: Primary | ICD-10-CM

## 2019-01-24 PROCEDURE — 97110 THERAPEUTIC EXERCISES: CPT | Performed by: PHYSICAL THERAPIST

## 2019-01-24 PROCEDURE — 97112 NEUROMUSCULAR REEDUCATION: CPT | Performed by: PHYSICAL THERAPIST

## 2019-01-24 PROCEDURE — 97140 MANUAL THERAPY 1/> REGIONS: CPT | Performed by: PHYSICAL THERAPIST

## 2019-01-24 NOTE — PROGRESS NOTES
Daily Note     Today's date: 2019  Patient name: Ezequiel Ko  : 1950  MRN: [id number]  Referring provider: Brie Beltrán MD  Dx:   Encounter Diagnosis     ICD-10-CM    1  Chronic left shoulder pain M25 512     G89 29                   Subjective: Patient notes feeling better today as she has been completing her exercises  Patient notes some occasional     Objective: See treatment diary below    Precautions: history of seizures, 2 brain surgeries, stroke affecting R side history of seizures     Daily Treatment Diary      Manual                   L first rib mob 5 min Gr III/IV  5 min Gr III/IV  6 min Gr III/IV                 shoulder posterior mob L 5 min Gr III/IV ? ?  5 min Gr III/IV D/C                   PROM in all directions L  If needed    IR/ER 5 min                   L shoulder IR MWM    2x10                                                 Exercise Diary                      Thoracic extension over horizontal bolster    2 min   2 min                  sidelying shoulder ER    3x10 L   3x10 L                  Prone low trap retraction    2x10   2x12                                          UBE    6 min  8 min                  Wall slides flexion or scaption    2x10 flexion/scaption  2x10 flexion/scaption                 Supine serratus punches     2x10 L  3x12 L  ->                  no moneys     2x10 red                                                                                                                                                                                                                                                                                                                        Modalities                                                                                                      Assessment: Tolerated treatment well  Patient improving functionally as patient is improving with active shoulder elevation    Patient had no increase in symptoms post treatment  Patient required some cuing to properly engage low trap and serratus during specific exercises  Plan: Progress treatment as tolerated

## 2019-01-29 ENCOUNTER — OFFICE VISIT (OUTPATIENT)
Dept: PHYSICAL THERAPY | Age: 69
End: 2019-01-29
Payer: MEDICARE

## 2019-01-29 DIAGNOSIS — M25.512 CHRONIC LEFT SHOULDER PAIN: Primary | ICD-10-CM

## 2019-01-29 DIAGNOSIS — G89.29 CHRONIC LEFT SHOULDER PAIN: Primary | ICD-10-CM

## 2019-01-29 PROCEDURE — 97110 THERAPEUTIC EXERCISES: CPT | Performed by: PHYSICAL THERAPIST

## 2019-01-29 PROCEDURE — 97112 NEUROMUSCULAR REEDUCATION: CPT | Performed by: PHYSICAL THERAPIST

## 2019-01-29 PROCEDURE — 97140 MANUAL THERAPY 1/> REGIONS: CPT | Performed by: PHYSICAL THERAPIST

## 2019-01-29 NOTE — PROGRESS NOTES
Daily Note     Today's date: 2019  Patient name: Yesenia Mares  : 1950  MRN: [id number]  Referring provider: Blane Santana MD  Dx:   Encounter Diagnosis     ICD-10-CM    1  Chronic left shoulder pain M25 512     G89 29                   Subjective: Patient notes improvement in her L shoulder symptoms  Objective: See treatment diary below    Precautions: history of seizures, 2 brain surgeries, stroke affecting R side history of seizures     Daily Treatment Diary      Manual                 L first rib mob 5 min Gr III/IV  5 min Gr III/IV  6 min Gr III/IV  6 min Gr III/IV               shoulder posterior mob L 5 min Gr III/IV ? ?  5 min Gr III/IV D/C                   PROM in all directions L  If needed    IR/ER 5 min   IR/ER 5 min                L shoulder IR MWM    2x10                                                 Exercise Diary                    Thoracic extension over horizontal bolster    2 min   2 min   2 min                sidelying shoulder ER    3x10 L   3x10 L   3x10 L                Prone low trap retraction    2x10   2x12   2x12                                        UBE    6 min  8 min   8 min                Wall slides flexion or scaption    2x10 flexion/scaption  2x10 flexion/scaption  2x10 flexion/scaption               Supine serratus punches     2x10 L  3x12 L  ->  2x10 1 lb                 no moneys     2x10 red   2x12 red                                                                                                                                                                                                                                                                                                                      Modalities                                                                                                      Assessment: Tolerated treatment well  Patient continues to have improved AROM shoulder elevation  Some exercises were progressed today and patient was still able to complete them without increase in symptoms  Plan: Progress treatment as tolerated

## 2019-01-31 ENCOUNTER — OFFICE VISIT (OUTPATIENT)
Dept: PHYSICAL THERAPY | Age: 69
End: 2019-01-31
Payer: MEDICARE

## 2019-01-31 DIAGNOSIS — G89.29 CHRONIC LEFT SHOULDER PAIN: Primary | ICD-10-CM

## 2019-01-31 DIAGNOSIS — M25.512 CHRONIC LEFT SHOULDER PAIN: Primary | ICD-10-CM

## 2019-01-31 PROCEDURE — 97110 THERAPEUTIC EXERCISES: CPT | Performed by: PHYSICAL THERAPIST

## 2019-01-31 PROCEDURE — 97140 MANUAL THERAPY 1/> REGIONS: CPT | Performed by: PHYSICAL THERAPIST

## 2019-01-31 PROCEDURE — 97112 NEUROMUSCULAR REEDUCATION: CPT | Performed by: PHYSICAL THERAPIST

## 2019-01-31 NOTE — PROGRESS NOTES
Daily Note     Today's date: 2019  Patient name: Olya aHyden  : 1950  MRN: [id number]  Referring provider: Lori Smith MD  Dx:   Encounter Diagnosis     ICD-10-CM    1  Chronic left shoulder pain M25 512     G89 29                   Subjective: Patient notes improvement in her L shoulder symptoms  Objective: See treatment diary below    Precautions: history of seizures, 2 brain surgeries, stroke affecting R side history of seizures     Daily Treatment Diary      Manual               L first rib mob 5 min Gr III/IV  5 min Gr III/IV  6 min Gr III/IV  6 min Gr III/IV  6 min Gr III/IV             shoulder posterior mob L 5 min Gr III/IV ? ?  5 min Gr III/IV D/C                   PROM in all directions L  If needed    IR/ER 5 min   IR/ER 5 min  IR/ER 5 min              L shoulder IR MWM    2x10                                                 Exercise Diary                  Thoracic extension over horizontal bolster    2 min   2 min   2 min   2 5 minutes             sidelying shoulder ER    3x10 L   3x10 L   3x10 L   3x10 L 1 lb             Prone low trap retraction    2x10   2x12   2x12   3x12                                      UBE    6 min  8 min   8 min   8 min             Wall slides flexion or scaption    2x10 flexion/scaption  2x10 flexion/scaption  2x10 flexion/scaption  2x10 flexion/scaption             Supine serratus punches     2x10 L  3x12 L  ->  2x10 1 lb   2x10 2 lbs              no moneys     2x10 red   2x12 red   3x10 red               ball on wall                                                                                                                                                                                                                                                                                                     Modalities                                                                                                      Assessment: Tolerated treatment well  Patient had no increase in symptoms post treatment and increased intensity of all proximal strengthening exercises  Plan: Progress treatment as tolerated

## 2019-02-05 ENCOUNTER — OFFICE VISIT (OUTPATIENT)
Dept: PHYSICAL THERAPY | Age: 69
End: 2019-02-05
Payer: MEDICARE

## 2019-02-05 DIAGNOSIS — G89.29 CHRONIC LEFT SHOULDER PAIN: Primary | ICD-10-CM

## 2019-02-05 DIAGNOSIS — M25.512 CHRONIC LEFT SHOULDER PAIN: Primary | ICD-10-CM

## 2019-02-05 PROCEDURE — 97110 THERAPEUTIC EXERCISES: CPT

## 2019-02-05 NOTE — PROGRESS NOTES
Daily Note     Today's date: 2019  Patient name: Milagros Mckay  : 1950  MRN: [id number]  Referring provider: Yong Slater MD  Dx:   Encounter Diagnosis     ICD-10-CM    1  Chronic left shoulder pain M25 512     G89 29                   Subjective: Patient notes improvement in her L shoulder symptoms  Able to reach behind back with L arm easier    Objective: See treatment diary below    Precautions: history of seizures, 2 brain surgeries, stroke affecting R side history of seizures     Daily Treatment Diary      Manual    2/5           L first rib mob 5 min Gr III/IV  5 min Gr III/IV  6 min Gr III/IV  6 min Gr III/IV  6 min Gr III/IV             shoulder posterior mob L 5 min Gr III/IV ? ?  5 min Gr III/IV D/C                   PROM in all directions L  If needed    IR/ER 5 min   IR/ER 5 min  IR/ER 5 min  IR5min/ER            L shoulder IR MWM    2x10                                                 Exercise Diary       2/5           Thoracic extension over horizontal bolster    2 min   2 min   2 min   2 5 minutes  2 5 minutes           sidelying shoulder ER    3x10 L   3x10 L   3x10 L   3x10 L 1 lb  1# 3x10           Prone low trap retraction    2x10   2x12   2x12   3x12   3x12                                   UBE    6 min  8 min   8 min   8 min  8min           Wall slides flexion or scaption    2x10 flexion/scaption  2x10 flexion/scaption  2x10 flexion/scaption  2x10 flexion/scaption  2x10  Flex/scaption           Supine serratus punches     2x10 L  3x12 L  ->  2x10 1 lb   2x10 2 lbs  2# 2x10            no moneys     2x10 red   2x12 red   3x10 red   [id number]            ball on wall                                                                                                                                                                                                                                                                                                     Modalities                                                                                                      Assessment: Tolerated treatment well  Patient no pain with active ex  Still with some discomfort during both IR /ER PROM  Plan: Progress treatment as tolerated

## 2019-02-07 ENCOUNTER — OFFICE VISIT (OUTPATIENT)
Dept: PHYSICAL THERAPY | Age: 69
End: 2019-02-07
Payer: MEDICARE

## 2019-02-07 DIAGNOSIS — G89.29 CHRONIC LEFT SHOULDER PAIN: Primary | ICD-10-CM

## 2019-02-07 DIAGNOSIS — M25.512 CHRONIC LEFT SHOULDER PAIN: Primary | ICD-10-CM

## 2019-02-07 PROCEDURE — 97110 THERAPEUTIC EXERCISES: CPT

## 2019-02-07 NOTE — PROGRESS NOTES
Daily Note     Today's date: 2019  Patient name: Blanca Hansen  : 1950  MRN: [id number]  Referring provider: Ray Juarez MD  Dx:   Encounter Diagnosis     ICD-10-CM    1  Chronic left shoulder pain M25 512     G89 29                   Subjective: Patient notes improvement in her L shoulder symptoms  Able to reach behind back with L arm easier  No pain noted today  Pt reports she and MD discussed D/C and that today would be last visit    Objective: See treatment diary below    Precautions: history of seizures, 2 brain surgeries, stroke affecting R side history of seizures     Daily Treatment Diary      Manual           L first rib mob 5 min Gr III/IV  5 min Gr III/IV  6 min Gr III/IV  6 min Gr III/IV  6 min Gr III/IV             shoulder posterior mob L 5 min Gr III/IV ? ?  5 min Gr III/IV D/C                   PROM in all directions L  If needed    IR/ER 5 min   IR/ER 5 min  IR/ER 5 min  IR5min/ER  IR/ER x 5 min          L shoulder IR MWM    2x10                                                 Exercise Diary              Thoracic extension over horizontal bolster    2 min   2 min   2 min   2 5 minutes  2 5 minutes  3 minutes         sidelying shoulder ER    3x10 L   3x10 L   3x10 L   3x10 L 1 lb  1# 3x10  1# 3x10         Prone low trap retraction    2x10   2x12   2x12   3x12   3x12  3x12                                 UBE    6 min  8 min   8 min   8 min  8min  8 min         Wall slides flexion or scaption    2x10 flexion/scaption  2x10 flexion/scaption  2x10 flexion/scaption  2x10 flexion/scaption  2x10  Flex/scaption  2x10 flex/scaption         Supine serratus punches     2x10 L  3x12 L  ->  2x10 1 lb   2x10 2 lbs  2# 2x10  2# 2x10          no moneys     2x10 red   2x12 red   3x10 red   [id number]  red 2x10          ball on wall                                                                                                                                                                                                                                                                                                     Modalities                                                                                                      Assessment: Tolerated treatment well  Ready for D/C  Handouts issued for home ex     Further assessment per LPT D/C summary       Plan: D/C PT to Home Program

## 2019-02-12 ENCOUNTER — APPOINTMENT (OUTPATIENT)
Dept: PHYSICAL THERAPY | Age: 69
End: 2019-02-12
Payer: MEDICARE

## 2019-02-14 ENCOUNTER — APPOINTMENT (OUTPATIENT)
Dept: PHYSICAL THERAPY | Age: 69
End: 2019-02-14
Payer: MEDICARE

## 2025-04-30 ENCOUNTER — NEW PATIENT COMPREHENSIVE (OUTPATIENT)
Dept: URBAN - METROPOLITAN AREA CLINIC 6 | Facility: CLINIC | Age: 75
End: 2025-04-30

## 2025-04-30 DIAGNOSIS — H40.013: ICD-10-CM

## 2025-04-30 DIAGNOSIS — H35.413: ICD-10-CM

## 2025-04-30 DIAGNOSIS — H25.813: ICD-10-CM

## 2025-04-30 PROCEDURE — 92004 COMPRE OPH EXAM NEW PT 1/>: CPT

## 2025-04-30 ASSESSMENT — VISUAL ACUITY
OU_CC: J2
OD_CC: 20/40
OS_CC: 20/40
OS_GLARE: 20/70
OD_GLARE: 20/50

## 2025-04-30 ASSESSMENT — TONOMETRY
OS_IOP_MMHG: 17
OD_IOP_MMHG: 17

## 2025-05-01 ENCOUNTER — PRE-OP CATARACT MEASUREMENTS (OUTPATIENT)
Dept: URBAN - METROPOLITAN AREA CLINIC 6 | Facility: CLINIC | Age: 75
End: 2025-05-01

## 2025-05-01 DIAGNOSIS — H25.813: ICD-10-CM

## 2025-05-01 PROCEDURE — 92012 INTRM OPH EXAM EST PATIENT: CPT

## 2025-05-01 PROCEDURE — 92136 OPHTHALMIC BIOMETRY: CPT

## 2025-05-01 ASSESSMENT — VISUAL ACUITY
OS_CC: 20/40
OD_CC: 20/50
OS_GLARE: 20/70
OD_GLARE: 20/60

## 2025-05-01 ASSESSMENT — KERATOMETRY
OS_K2POWER_DIOPTERS: 45.75
OD_K2POWER_DIOPTERS: 46.00
OD_K1POWER_DIOPTERS: 43.25
OS_AXISANGLE2_DEGREES: 159
OS_K1POWER_DIOPTERS: 43.50
OD_AXISANGLE2_DEGREES: 17
OD_AXISANGLE_DEGREES: 107
OS_AXISANGLE_DEGREES: 69

## 2025-05-01 ASSESSMENT — TONOMETRY
OD_IOP_MMHG: 17
OS_IOP_MMHG: 16

## 2025-05-22 ENCOUNTER — SURGERY/PROCEDURE (OUTPATIENT)
Dept: URBAN - METROPOLITAN AREA SURGICAL CENTER 6 | Facility: SURGICAL CENTER | Age: 75
End: 2025-05-22

## 2025-05-22 DIAGNOSIS — H25.812: ICD-10-CM

## 2025-05-22 PROCEDURE — 66984 XCAPSL CTRC RMVL W/O ECP: CPT

## 2025-05-22 PROCEDURE — MISCMFIOL MISCMFIOL

## 2025-05-22 PROCEDURE — MISCFEMTO FEMTO

## 2025-05-23 ENCOUNTER — 1 DAY POST-OP (OUTPATIENT)
Dept: URBAN - METROPOLITAN AREA CLINIC 6 | Facility: CLINIC | Age: 75
End: 2025-05-23

## 2025-05-23 DIAGNOSIS — Z96.1: ICD-10-CM

## 2025-05-23 DIAGNOSIS — H25.811: ICD-10-CM

## 2025-05-23 PROCEDURE — 99024 POSTOP FOLLOW-UP VISIT: CPT

## 2025-05-23 PROCEDURE — 92136 OPHTHALMIC BIOMETRY: CPT | Mod: 26,RT

## 2025-05-23 ASSESSMENT — KERATOMETRY
OD_AXISANGLE_DEGREES: 107
OD_AXISANGLE2_DEGREES: 17
OS_K2POWER_DIOPTERS: 45.75
OS_AXISANGLE2_DEGREES: 159
OS_K1POWER_DIOPTERS: 43.50
OD_K2POWER_DIOPTERS: 46.00
OS_AXISANGLE_DEGREES: 69
OD_K1POWER_DIOPTERS: 43.25

## 2025-05-23 ASSESSMENT — TONOMETRY
OD_IOP_MMHG: 19
OS_IOP_MMHG: 24

## 2025-05-23 ASSESSMENT — VISUAL ACUITY
OD_CC: 20/40
OS_SC: 20/50

## 2025-06-04 ENCOUNTER — 1 DAY POST-OP (OUTPATIENT)
Dept: URBAN - METROPOLITAN AREA CLINIC 6 | Facility: CLINIC | Age: 75
End: 2025-06-04

## 2025-06-04 DIAGNOSIS — Z96.1: ICD-10-CM

## 2025-06-04 PROCEDURE — 99024 POSTOP FOLLOW-UP VISIT: CPT

## 2025-06-04 ASSESSMENT — VISUAL ACUITY
OD_SC: 20/30-1
OS_SC: 20/25

## 2025-06-04 ASSESSMENT — KERATOMETRY
OD_AXISANGLE_DEGREES: 107
OS_AXISANGLE2_DEGREES: 159
OD_AXISANGLE2_DEGREES: 17
OS_K1POWER_DIOPTERS: 43.50
OD_K2POWER_DIOPTERS: 46.00
OS_AXISANGLE_DEGREES: 69
OS_K2POWER_DIOPTERS: 45.75
OD_K1POWER_DIOPTERS: 43.25

## 2025-06-04 ASSESSMENT — TONOMETRY
OD_IOP_MMHG: 28
OS_IOP_MMHG: 12

## 2025-06-19 ENCOUNTER — 1 WEEK POST-OP (OUTPATIENT)
Dept: URBAN - METROPOLITAN AREA CLINIC 6 | Facility: CLINIC | Age: 75
End: 2025-06-19

## 2025-06-19 DIAGNOSIS — Z96.1: ICD-10-CM

## 2025-06-19 PROCEDURE — 99024 POSTOP FOLLOW-UP VISIT: CPT

## 2025-06-19 ASSESSMENT — KERATOMETRY
OS_AXISANGLE_DEGREES: 69
OS_AXISANGLE2_DEGREES: 159
OD_AXISANGLE2_DEGREES: 17
OD_AXISANGLE_DEGREES: 107
OD_K2POWER_DIOPTERS: 46.00
OD_K1POWER_DIOPTERS: 43.25
OS_K2POWER_DIOPTERS: 45.75
OS_K1POWER_DIOPTERS: 43.50

## 2025-06-19 ASSESSMENT — TONOMETRY
OD_IOP_MMHG: 14
OS_IOP_MMHG: 13

## 2025-06-19 ASSESSMENT — VISUAL ACUITY
OD_SC: 20/30
OS_SC: 20/25+1

## (undated) RX ORDER — BRINZOLAMIDE/BRIMONIDINE TARTRATE 10; 2 MG/ML; MG/ML
1 SUSPENSION/ DROPS OPHTHALMIC TWICE A DAY
End: 2025-06-11

## (undated) RX ORDER — BRIMONIDINE TARTRATE, TIMOLOL MALEATE 2; 5 MG/ML; MG/ML: 1 SOLUTION/ DROPS OPHTHALMIC TWICE A DAY